# Patient Record
Sex: MALE | Race: OTHER | Employment: OTHER | URBAN - METROPOLITAN AREA
[De-identification: names, ages, dates, MRNs, and addresses within clinical notes are randomized per-mention and may not be internally consistent; named-entity substitution may affect disease eponyms.]

---

## 2018-04-29 ENCOUNTER — HOSPITAL ENCOUNTER (OUTPATIENT)
Age: 83
Discharge: OTHER INSTITUTION | End: 2018-04-29
Payer: MEDICARE

## 2018-04-29 VITALS
HEART RATE: 99 BPM | DIASTOLIC BLOOD PRESSURE: 99 MMHG | SYSTOLIC BLOOD PRESSURE: 152 MMHG | RESPIRATION RATE: 12 BRPM | TEMPERATURE: 98 F

## 2018-04-29 DIAGNOSIS — R10.9 ABDOMINAL PAIN, ACUTE: Primary | ICD-10-CM

## 2018-04-29 PROCEDURE — 99202 OFFICE O/P NEW SF 15 MIN: CPT

## 2018-04-29 RX ORDER — TAMSULOSIN HYDROCHLORIDE 0.4 MG/1
CAPSULE ORAL DAILY
COMMUNITY

## 2018-04-29 NOTE — ED INITIAL ASSESSMENT (HPI)
Patient comes in with generalized abdominal pain, constant in nature which started at 10:00 this morning. Pain is crampy in nature. Had a bowel movement this morning.

## 2018-04-29 NOTE — ED PROVIDER NOTES
[address]: [address]    History   Patient presents with:  Abdomen/Flank Pain (GI/)    Stated Complaint: STOMACH PAIN    HPI    27-year-old male history of hypertension, BPH, complaining of abdominal pain, crampy Cardiovascular: Normal rate, regular rhythm and normal heart sounds. Pulmonary/Chest: Effort normal and breath sounds normal.   Abdominal: Soft. Bowel sounds are normal. He exhibits no distension and no mass. There is tenderness. There is rebound.  The